# Patient Record
Sex: MALE | Race: WHITE
[De-identification: names, ages, dates, MRNs, and addresses within clinical notes are randomized per-mention and may not be internally consistent; named-entity substitution may affect disease eponyms.]

---

## 2020-08-09 ENCOUNTER — HOSPITAL ENCOUNTER (INPATIENT)
Dept: HOSPITAL 82 - ED | Age: 50
LOS: 2 days | Discharge: ANOTHER HOSPITAL | DRG: 920 | End: 2020-08-11
Attending: INTERNAL MEDICINE | Admitting: INTERNAL MEDICINE
Payer: COMMERCIAL

## 2020-08-09 VITALS — DIASTOLIC BLOOD PRESSURE: 65 MMHG | SYSTOLIC BLOOD PRESSURE: 116 MMHG

## 2020-08-09 VITALS — SYSTOLIC BLOOD PRESSURE: 126 MMHG | DIASTOLIC BLOOD PRESSURE: 83 MMHG

## 2020-08-09 VITALS — DIASTOLIC BLOOD PRESSURE: 70 MMHG | SYSTOLIC BLOOD PRESSURE: 105 MMHG

## 2020-08-09 VITALS — DIASTOLIC BLOOD PRESSURE: 81 MMHG | SYSTOLIC BLOOD PRESSURE: 131 MMHG

## 2020-08-09 VITALS — HEIGHT: 67 IN | BODY MASS INDEX: 39.21 KG/M2 | WEIGHT: 249.81 LBS
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
## 2020-08-09 DIAGNOSIS — K44.9: ICD-10-CM

## 2020-08-09 DIAGNOSIS — N36.5: ICD-10-CM

## 2020-08-09 DIAGNOSIS — D62: ICD-10-CM

## 2020-08-09 DIAGNOSIS — Z85.46: ICD-10-CM

## 2020-08-09 DIAGNOSIS — R33.8: ICD-10-CM

## 2020-08-09 DIAGNOSIS — N30.90: ICD-10-CM

## 2020-08-09 DIAGNOSIS — B96.89: ICD-10-CM

## 2020-08-09 DIAGNOSIS — K76.0: ICD-10-CM

## 2020-08-09 DIAGNOSIS — N99.820: Primary | ICD-10-CM

## 2020-08-09 DIAGNOSIS — Z11.59: ICD-10-CM

## 2020-08-09 DIAGNOSIS — B19.20: ICD-10-CM

## 2020-08-09 DIAGNOSIS — N13.30: ICD-10-CM

## 2020-08-09 DIAGNOSIS — R16.1: ICD-10-CM

## 2020-08-09 DIAGNOSIS — Z90.79: ICD-10-CM

## 2020-08-09 LAB
ALBUMIN SERPL-MCNC: 3.3 G/DL (ref 3.2–5)
ALP SERPL-CCNC: 133 U/L (ref 38–126)
ANION GAP SERPL CALCULATED.3IONS-SCNC: 14 MMOL/L
AST SERPL-CCNC: 20 U/L (ref 17–59)
BACTERIA #/AREA URNS HPF: (no result) HPF
BASOPHILS NFR BLD AUTO: 1 % (ref 0–3)
BILIRUB UR QL STRIP.AUTO: NEGATIVE
BUN SERPL-MCNC: 16 MG/DL (ref 9–20)
BUN/CREAT SERPL: 14
CHLORIDE SERPL-SCNC: 105 MMOL/L (ref 95–108)
CO2 SERPL-SCNC: 21 MMOL/L (ref 22–30)
COLOR UR AUTO: (no result)
CREAT SERPL-MCNC: 1.1 MG/DL (ref 0.7–1.3)
EOSINOPHIL NFR BLD AUTO: 0 % (ref 0–8)
ERYTHROCYTE [DISTWIDTH] IN BLOOD BY AUTOMATED COUNT: 16.1 % (ref 11.5–15.5)
GLUCOSE UR STRIP.AUTO-MCNC: 100 MG/DL
HCT VFR BLD AUTO: 27.1 % (ref 39–50)
HGB BLD-MCNC: 7.9 G/DL (ref 14–18)
HGB UR QL STRIP.AUTO: (no result)
IMM GRANULOCYTES NFR BLD: 0.3 % (ref 0–5)
KETONES UR STRIP.AUTO-MCNC: 15 MG/DL
LEUKOCYTE ESTERASE UR QL STRIP.AUTO: (no result)
LYMPHOCYTES NFR BLD: 22 % (ref 15–41)
MCH RBC QN AUTO: 22.9 PG  CALC (ref 26–32)
MCHC RBC AUTO-ENTMCNC: 29.2 G/DL CAL (ref 32–36)
MCV RBC AUTO: 78.6 FL  CALC (ref 80–100)
MONOCYTES NFR BLD AUTO: 9 % (ref 2–13)
NEUTROPHILS # BLD AUTO: 6.49 THOU/UL (ref 1.82–7.42)
NEUTROPHILS NFR BLD AUTO: 69 % (ref 42–76)
NITRITE UR QL STRIP.AUTO: POSITIVE
PH UR STRIP.AUTO: 6.5 [PH] (ref 4.5–8)
PLATELET # BLD AUTO: 336 THOU/UL (ref 130–400)
POTASSIUM SERPL-SCNC: 3.7 MMOL/L (ref 3.5–5.1)
PROT SERPL-MCNC: 6.9 G/DL (ref 6.3–8.2)
PROT UR QL STRIP.AUTO: >=300 MG/DL
RBC # BLD AUTO: 3.45 MILL/UL (ref 4.7–6.1)
RBC #/AREA URNS HPF: (no result) RBC/HPF (ref 0–5)
SODIUM SERPL-SCNC: 136 MMOL/L (ref 137–146)
SP GR UR STRIP.AUTO: 1.02
UROBILINOGEN UR QL STRIP.AUTO: 4 E.U./DL

## 2020-08-09 PROCEDURE — C1769 GUIDE WIRE: HCPCS

## 2020-08-09 PROCEDURE — 0T9B70Z DRAINAGE OF BLADDER WITH DRAINAGE DEVICE, VIA NATURAL OR ARTIFICIAL OPENING: ICD-10-PCS | Performed by: EMERGENCY MEDICINE

## 2020-08-09 PROCEDURE — P9016 RBC LEUKOCYTES REDUCED: HCPCS

## 2020-08-09 PROCEDURE — 30233N1 TRANSFUSION OF NONAUTOLOGOUS RED BLOOD CELLS INTO PERIPHERAL VEIN, PERCUTANEOUS APPROACH: ICD-10-PCS | Performed by: EMERGENCY MEDICINE

## 2020-08-09 NOTE — NUR
18 F CAVAZOS CATHETER INSERTED USING STERILE TECHNIQUE; UROJET PRIOR TO
INSERTION; PT TOLERATED WELL; MODERATE BLOODY URINE WITH CLOTS NOTED; PT
ADVISED OF CONTINUED WAIT TIME AND POC; ALFONSO AT BEDSIDE; WILL CONTINUE TO
MONITOR

## 2020-08-09 NOTE — NUR
V/S STABLE, PT DENIES ANY DISTRESS OR SIDE AFFECTS. REPORTS MILD PRESSURE AT
CAVAZOS INSERTION SITE, PT HAS BEEN MEDICATED FOR SUCH. 800CC BLOODY URINE
EMPTIED FROM CAVAZOS BAG SO FAR, WILL CONTINUE TO MONITOR.

## 2020-08-09 NOTE — NUR
PT ARRIVED TO MED SURG UNIT ACCOMPANIED BY ED NURSE AND GUARDS X2. PT APPEARS
TO BE IN STABLE CONDITION. PT V/S BEING ASSESSED AND ORIENTED TO ROOM, CALL
SYSTEM, LIGHTS AND BED BY AIDE.

## 2020-08-09 NOTE — NUR
FIRST UNIT OF PRBC'S STARTED. PT HAS BEEN INFORMED TWICE WHAT SYMPTOMS TO
REPORTS. HE VERBALIZED UNDERSTANDING. GUARDS X1 AT BEDSIDE. WILL REMAIN WITH
PT FOR FIST 15 MINUTES AND REASSESS V/S AT THAT POINT.

## 2020-08-09 NOTE — NUR
SPOKE WITH ED NURSE REGARDING CAVAZOS DRAINAGE AND NEED FOR CBI, NOTIFIED HOUSE
SUPERVISOR AND ED THAT WE COULD NOT TAKE THIS PT ON FLOOR IF CBI WAS REQUIRED.
I WAS TOLD THAT THEY CHECKED W/ED PHYSICIAN AND DR. TONY AND WERE TOLD THAT
THE CAVAZOS WAS PATENT W/URINE THEREFORE NO NEED FOR BLADDER IRRIGATION AT THIS
TIME.

## 2020-08-09 NOTE — NUR
PRBC RUNNING @125/SITE APPEARS HEALTHY, PT IS SITTING UP WATCHING TV, DENIES
ANY SYMPTOMS AND REPORTS FEELING A LITTLE BETTER IN THE BLADDER AREA ALSO.
BLOOD TINGED URINE IN CAVAZOS BAG W/SMALL CLOTS TO TUBING, BUT APPEARS TO BE
PATENT. GUARDS AT BEDSIDE X2. SNACK PROVIDED TO PT AND ICE TO GUARD X1.

## 2020-08-10 VITALS — DIASTOLIC BLOOD PRESSURE: 75 MMHG | SYSTOLIC BLOOD PRESSURE: 121 MMHG

## 2020-08-10 VITALS — DIASTOLIC BLOOD PRESSURE: 65 MMHG | SYSTOLIC BLOOD PRESSURE: 103 MMHG

## 2020-08-10 VITALS — DIASTOLIC BLOOD PRESSURE: 73 MMHG | SYSTOLIC BLOOD PRESSURE: 115 MMHG

## 2020-08-10 VITALS — SYSTOLIC BLOOD PRESSURE: 125 MMHG | DIASTOLIC BLOOD PRESSURE: 86 MMHG

## 2020-08-10 VITALS — SYSTOLIC BLOOD PRESSURE: 117 MMHG | DIASTOLIC BLOOD PRESSURE: 76 MMHG

## 2020-08-10 VITALS — DIASTOLIC BLOOD PRESSURE: 70 MMHG | SYSTOLIC BLOOD PRESSURE: 106 MMHG

## 2020-08-10 VITALS — SYSTOLIC BLOOD PRESSURE: 129 MMHG | DIASTOLIC BLOOD PRESSURE: 81 MMHG

## 2020-08-10 VITALS — SYSTOLIC BLOOD PRESSURE: 128 MMHG | DIASTOLIC BLOOD PRESSURE: 85 MMHG

## 2020-08-10 VITALS — SYSTOLIC BLOOD PRESSURE: 123 MMHG | DIASTOLIC BLOOD PRESSURE: 80 MMHG

## 2020-08-10 VITALS — DIASTOLIC BLOOD PRESSURE: 81 MMHG | SYSTOLIC BLOOD PRESSURE: 117 MMHG

## 2020-08-10 VITALS — SYSTOLIC BLOOD PRESSURE: 127 MMHG | DIASTOLIC BLOOD PRESSURE: 74 MMHG

## 2020-08-10 VITALS — DIASTOLIC BLOOD PRESSURE: 80 MMHG | SYSTOLIC BLOOD PRESSURE: 131 MMHG

## 2020-08-10 LAB
ANION GAP SERPL CALCULATED.3IONS-SCNC: 10 MMOL/L
BASOPHILS NFR BLD AUTO: 1 % (ref 0–3)
BUN SERPL-MCNC: 14 MG/DL (ref 9–20)
BUN/CREAT SERPL: 14
CHLORIDE SERPL-SCNC: 107 MMOL/L (ref 95–108)
CO2 SERPL-SCNC: 24 MMOL/L (ref 22–30)
CREAT SERPL-MCNC: 1 MG/DL (ref 0.7–1.3)
EOSINOPHIL NFR BLD AUTO: 3 % (ref 0–8)
ERYTHROCYTE [DISTWIDTH] IN BLOOD BY AUTOMATED COUNT: 16.1 % (ref 11.5–15.5)
HCT VFR BLD AUTO: 26.2 % (ref 39–50)
HCT VFR BLD AUTO: 27.1 % (ref 39–50)
HGB BLD-MCNC: 7.8 G/DL (ref 14–18)
HGB BLD-MCNC: 8.1 G/DL (ref 14–18)
IMM GRANULOCYTES NFR BLD: 0.3 % (ref 0–5)
LYMPHOCYTES NFR BLD: 26 % (ref 15–41)
MCH RBC QN AUTO: 23.7 PG  CALC (ref 26–32)
MCHC RBC AUTO-ENTMCNC: 29.8 G/DL CAL (ref 32–36)
MCV RBC AUTO: 79.6 FL  CALC (ref 80–100)
MONOCYTES NFR BLD AUTO: 12 % (ref 2–13)
NEUTROPHILS # BLD AUTO: 3.47 THOU/UL (ref 1.82–7.42)
NEUTROPHILS NFR BLD AUTO: 58 % (ref 42–76)
PLATELET # BLD AUTO: 292 THOU/UL (ref 130–400)
POTASSIUM SERPL-SCNC: 3.7 MMOL/L (ref 3.5–5.1)
RBC # BLD AUTO: 3.29 MILL/UL (ref 4.7–6.1)
SODIUM SERPL-SCNC: 136 MMOL/L (ref 137–146)

## 2020-08-10 PROCEDURE — 0TCB8ZZ EXTIRPATION OF MATTER FROM BLADDER, VIA NATURAL OR ARTIFICIAL OPENING ENDOSCOPIC: ICD-10-PCS

## 2020-08-10 NOTE — NUR
CALLED  SPOKE TO LU[facility] GAVE INFORMATION REGARDING PT. WRITER WAS
TOLD HE WOULD CALL IF HE HAD QUESTIONS.

## 2020-08-10 NOTE — NUR
PRBC TRANSFUSION ENDED AT THIS TIME. NS RUNNING @125 TO SITE IN RAC. PT
TOLERATED TRANSFUSION WELL, V/S ASSESSED AT THIS TIME TO BE STABLE. PT IS
AWAKE, ALERT, WATCHING TV W/GUARDS X2 AT BEDSIDE.

## 2020-08-10 NOTE — NUR
REPORT RECEIVED FROM MILVIA COCHRAN;PT RESTING IN SEMI FOWERS POSITION WITH X2
GUARDS AT BEDSIDE;INTRODUCED SELF TO PT AND POC DISCUSSED;RESPIRATIONS EVEN
AND UNLABORED ON RA;PT DENIES ANY CURRENT PAIN OR DISCOMFORTS;CAVAZOS CATHETER
NOTED DRAINING BLOODY URINE TO GRAVITY WITH EASE;IV FLUIDS INFUSING PER
ORDER;PT ENCOURAGED TO CALL FOR ASSISTANCE IF NEEDED;FALL PRECAUTIONS IN PLAC
WITH BED IN THE LOWEST POSITON AND CALL LIGHT IN REACH;WILL CONTINUE TO
MONITOR

## 2020-08-10 NOTE — NUR
PT RESTING IN SEMI FOWLERS POSITION,A&O X3.X2 GUARDS AT BEDSIDE AND LEFT ANKLE
SHACKLED TO BED;PT REPORTS ABDOMINAL DISCOMFORTS, YAMILETH,ANRP NOTIFIED AND
NEW ORDER FOR PAIN MEDICATION TO BE REVEIVED;RESPIRATIONS EVEN AND UNLABORED
ON RA,CLEAR LUNG SOUNDS;ABDOMEN DISTENDED/SOFT ON PALPATION AND ACTIVE IN ALL
4 QUADRANTS;CAVAZOS CATHETER PATENT DRAINING MINIMAL BLOODY URINE TO
GRAVITY;STRONG PEDAL PULSES;SKIN INTACT;TELE MONITORING IN PLACE;#20G TO RAC
INFUSING NS @ 80ML/HR,SITE APPEARS HEALTHY;PT DENIES ANY ADDITIONAL NEEDS AT
THIS TIME;NPO DIET REINFORCED;PT ENCOURAGED TO CALL FOR ASSISTANCE IF
NEEDED;CALL LIGHT IN REACH;WILL CONTINUE TO MONITOR

## 2020-08-10 NOTE — NUR
100CC OF DARK BLOODY URINE IN CAVAZOS BAG AND LINE DRAINING NEW URINE FROM LAST
MONITOR OF 15 MINUTES AGO.

## 2020-08-10 NOTE — NUR
PT ARRIVED TO MS2 VIA STRETCHER ACCOMPANIED BY OR STAFF AND 2 GUARDS. PT ALERT
AND ORIENTED X3, RESP EVEN AND UNLABORED. 02 2L NC AT THIS TIME. DISCUSSED
POC, PT HAS CBI BAG #1 TO GRAVITY AT THIS TIME. CAVAZOS STRAP TO R INNER THIGH.
URINE IN FOLY CLEAR YELLOW. NOTED HEALING INCISIONS TO ABD DRY AND SCABBED.
SCD'S IN PLACE. NOTED EDEMA TO LLE, ASSESSMENT COMPLETED AT THIS TIME, CALL
LIGHT IN REACH,CONTINUE TO MONITOR.

## 2020-08-10 NOTE — NUR
PT RESTING IN SEMI FOWLERS POSITION WITH X2 GUARDS AT BEDSIDE;RESPIRATIONS
EVEN AND UNLABORED ON RA;PT REPORTS PENILE/ABDOMINAL PAIN RATING 10/10 ON THE
PAIN SCALE, PT MEDICATED WITH PRN MORPHINE 2MG  IVP;IV FLUIDS INFUSING WITH
EASE PER ORDER;CBI RUNNING WITH EASE PER ORDER;TELE MONITORING IN PLACE;PT
DENIES ANY ADDITIONAL NEEDS;ENCOURAGED TO CALL FOR ASSISTANCE IF NEEDED;CALL
LIGHT IN REACH;WILL CONTINUE TO MONITOR

## 2020-08-10 NOTE — NUR
PT REPORTS FEELING OKAY, DENIES INCREASED PRESSURE OR PAIN, BUT HAS NOT SEEN
URINE GOING IN TUBE RECENTLY SO HE THINKS IT IS CLOTTED OFF. CNA RECENLTY
EMPTIED CAVAZOS BAG AND THERE IS +-50CC IN BAG. TUBING DRAINED AT THIS TIME AND
I WILL MONITOR CLOSELY FOR ADDITIONAL DRAINAGE OF URINE.

## 2020-08-10 NOTE — NUR
PT TRANSPORTED TO OR IN STABLE CONDITION VIA STRETCHER ACCOMPANIED BY X2 OR
STAFF MEMBERS AND X2 GUARDS.

## 2020-08-10 NOTE — NUR
PT RESTING IN SEMI FOWLERS POSITION WITH X2 GUARDS AT BEDSIDE AND LEFT ANKLE
SHACKLED TO THE BED;RESPIRATIONS EVEN AND UNLABORED ON RA;PT DENIES ANY
CURRENT NEEDS;CDI RUNNING AT A TITRATED RATE DRAINING CLEAR/YELLOW
FLUID,CURRENTLY ON BAG #4;IV SITE PATENT INFUSING NS @ 80ML/HR;VS OBTAINED;NPO
DIET REINFORCED;PT SCHEDULED TO GO TO OR AT APPROX 1800 AND VERBALIZES
UNDERSTANDING;PT DENIES ANY ADDITIONAL NEEDS AT THIS TIME AND IS ENCOURAGED TO
CALL FOR ASSISTANCE IF NEEDED;CALL LIGHT IN REACH;WILL CONTINUE TO MONITOR

## 2020-08-10 NOTE — NUR
3WAY CAVAZOS CATHETER PLACED AT THIS TIME AND CBI INITIATED, MANUAL FLUSH OF
SALINE PROVIDED TO REMOVED CLOTS, URINE PINK WITH SEDIMENT NOTED;PT TOLERATED
WELL;LEG STRAP IN PLACE;PT TO BE MEDICATED FOR PAIN, AWAITING MD ORDERS.WILL
CONTINUE TO MONITOR

## 2020-08-11 VITALS — SYSTOLIC BLOOD PRESSURE: 101 MMHG | DIASTOLIC BLOOD PRESSURE: 64 MMHG

## 2020-08-11 VITALS — SYSTOLIC BLOOD PRESSURE: 128 MMHG | DIASTOLIC BLOOD PRESSURE: 72 MMHG

## 2020-08-11 VITALS — DIASTOLIC BLOOD PRESSURE: 82 MMHG | SYSTOLIC BLOOD PRESSURE: 112 MMHG

## 2020-08-11 LAB
ANION GAP SERPL CALCULATED.3IONS-SCNC: 8 MMOL/L
BUN SERPL-MCNC: 11 MG/DL (ref 9–20)
BUN/CREAT SERPL: 10
CHLORIDE SERPL-SCNC: 108 MMOL/L (ref 95–108)
CO2 SERPL-SCNC: 26 MMOL/L (ref 22–30)
CREAT SERPL-MCNC: 1 MG/DL (ref 0.7–1.3)
ERYTHROCYTE [DISTWIDTH] IN BLOOD BY AUTOMATED COUNT: 16.3 % (ref 11.5–15.5)
HCT VFR BLD AUTO: 27.5 % (ref 39–50)
HGB BLD-MCNC: 8.1 G/DL (ref 14–18)
MAGNESIUM SERPL-MCNC: 2.3 MG/DL (ref 1.6–2.3)
MCH RBC QN AUTO: 24 PG  CALC (ref 26–32)
MCHC RBC AUTO-ENTMCNC: 29.5 G/DL CAL (ref 32–36)
MCV RBC AUTO: 81.4 FL  CALC (ref 80–100)
PLATELET # BLD AUTO: 290 THOU/UL (ref 130–400)
POTASSIUM SERPL-SCNC: 4 MMOL/L (ref 3.5–5.1)
RBC # BLD AUTO: 3.38 MILL/UL (ref 4.7–6.1)
SODIUM SERPL-SCNC: 138 MMOL/L (ref 137–146)

## 2020-08-11 NOTE — NUR
ALL DISCHARGE INSTRUCTIONS PROVIDED AT THIS TIME;PT INSTRUCTED ON ABX FOR 14
DAYS AND PENDING URINE CULTURE,RX AND D/C INSTRUCTIONS PROVIDED TO GUARDS FOR
TRANSPORTATION BACK TO [facility];PT INSTRUCTED ON CAVAZOS CATHETER
AND HOW IT MUST REMAIN IN PLACE FOR THE NEXT 10-14 DAYS AND THAT IT MAY NEED
IRRIGATION AT TIMES FOR CLOT FORMATION;PT TO [facility] WITH  IN THE
NEXT 2 WEEKS;IV SITE REMOVED WITH CATHETER INTACT AND TELE MONITORING
D/C;CAVAZOS CATHETER BAG CHANGED FROM 4000ML TO 2000ML BAG;PT DENIES ANY
ADDITIONAL QUESTIONS OR NEEDS;WHEELCHAIR TO BE PROVIDED FOR D/C HOME;GUARDS TO
TRANSPORT PT BACK TO FACILITY;WILL CONTINUE TO MONITOR

## 2020-08-11 NOTE — NUR
REPORT RECEIVED FROM GRAYSON SHELDON;PT RESTING IN SEMI FOWLERS POSITION WITH X2
GUARDS AT BEDSIDE AND LEFT ANKLE SHACKLED TO BED;INTRODUCED SELF TO PT AND POC
DISCUSSED;PT DENIES ANY CURRENT NEEDS;RESPIRATIONS EVEN AND UNLABORED ON
RA;TELE MONITORING IN PLACE;PT ENCOURAGED TO CALL FOR ASSISTANCE IF
NEEDED;CALL LIGHT IN REACH;WILL CONTINUE TO MONITOR

## 2020-08-11 NOTE — NUR
PT RESTING IN SEMI FOWLERS POSITION WITH X2 GUARDS AT BEDSIDE;RESPIRATIONS
EVEN AND UNLABORED ON RA;PT DENIES ANY CURRENT PAIN OR NEEDS;TELE MONITORING
IN PLACE;IV SITE PATENT;CAVAZOS CATHETER REMAINS PATENT DRAINING TO GRAVITY WITH
EASE, SMALL AMOUNTS OF BLOODY URINE NOTED AFTER PT AMBULATED AND SHOWERED;SODA
PROVIDED PER REQUEST;PT DENIES ANY ADDITIONAL NEEDS AT THIS TIME;ASSESSMENT
REMAINS UNCHANGED;ENCOURAGED TO CALL FOR ASSISTANCE IF NEEDED;CALL LIGHT IN
REACH;WILL CONTINUE TO MONITOR

## 2020-08-11 NOTE — NUR
PT RESTING IN BED WITH EYES CLOSED, RESP EVEN AND UNLABORED. BAG #1 DRAINING
TO GRAVITY, GUARDS X2 AT BEDSIDE, CALL LIGHT IN REACH,CONTINUE TO MONITOR.

## 2020-08-11 NOTE — NUR
PT RESTING IN SEMI FOWLERS POSITION WITH X2 GUARDS AT BEDSIDE AND LEFT ANKLE
SHACKLED TO BED,A&O X3;VS OBTAINED AND ASSESSMENT COMPLETED;PT DENIES ANY
CURRENT PAIN OR DISCOMFORTS,PAIN SCALE AND REPORTING EDUCATED;PT POST OP DAY
#1 CYSTOSCOPY;RESPIRATIONS EVEN AND UNLABORED ON RA,CLEAR LUNG SOUNDS;ABDOMEN
DISTENDED/SOFT ON PALPATION AND ACTIVE IN ALL 4 QUADRANTS;3WAY CAVAZOS CATHETER
PATENT DRAINING YELLOW URINE TO GRAVITY WITH EASE,LEG STRAP IN PLACE;STRONG
PEDAL PULSES;SKIN INTACT;TELE MONITORING IN PLACE;#20G TO RAC FLUSHED AND
PATENT,SITE APPEARS HEALTHY;PT DENIES ANY ADDITIONAL NEEDS AT THIS TIME AND IS
ENCOURAGED TO CALL FOR ASSISTANCE IF NEEDED;CALL LIGHT IN REACH;WILL CONTINUE
TO MONITOR

## 2020-08-11 NOTE — NUR
PT RESTING IN BED GUARDS X2 AT BEDSIDE, CBI STOPPED AT THIS TIME. CLEAR URINE
EMPTIED, CALL LIGHT IN REACH,CONTINUE TO MONITOR.

## 2020-08-11 NOTE — NUR
Discharge instructions given. Patient verbalizes understanding of same.
[facility]
*Other. All belongings sent with pt.
PT TRANSPORTED TO [facility] IN STABLE CONDITION VIA WHEELCHAIR ACCOMPANIED BY
CORNELL ANDRADE AND X2 GUARDS.GUARDS TO TRANSPORT PT BACK TO [facility]RECTIONAL
FACILITY;D/C INSTRUCTIONS AND RX LEFT WITH GUARDS.

## 2020-08-11 NOTE — NUR
BAG #2 DRAINING TO GRAVITY, NO SIGNS OF DISTRESS NOTED, RESP EVEN AND
UNLABORED. GUARDS AT BEDSIDE, CALL LIGHT IN REACH,CONTINUE TO MONITOR.

## 2023-02-21 ENCOUNTER — HOSPITAL ENCOUNTER (OUTPATIENT)
Dept: HOSPITAL 82 - ORM | Age: 53
Discharge: ANOTHER HOSPITAL | DRG: 354 | End: 2023-02-21
Attending: SURGERY
Payer: COMMERCIAL

## 2023-02-21 VITALS — WEIGHT: 271 LBS | BODY MASS INDEX: 42.53 KG/M2 | HEIGHT: 67 IN

## 2023-02-21 VITALS — SYSTOLIC BLOOD PRESSURE: 124 MMHG | DIASTOLIC BLOOD PRESSURE: 82 MMHG

## 2023-02-21 DIAGNOSIS — E66.01: ICD-10-CM

## 2023-02-21 DIAGNOSIS — K43.2: Primary | ICD-10-CM

## 2023-02-21 DIAGNOSIS — Z90.79: ICD-10-CM

## 2023-02-21 PROCEDURE — 0WUF4JZ SUPPLEMENT ABDOMINAL WALL WITH SYNTHETIC SUBSTITUTE, PERCUTANEOUS ENDOSCOPIC APPROACH: ICD-10-PCS | Performed by: SURGERY
